# Patient Record
Sex: MALE | Race: WHITE | NOT HISPANIC OR LATINO | Employment: OTHER | URBAN - NONMETROPOLITAN AREA
[De-identification: names, ages, dates, MRNs, and addresses within clinical notes are randomized per-mention and may not be internally consistent; named-entity substitution may affect disease eponyms.]

---

## 2017-01-03 ENCOUNTER — OFFICE VISIT (OUTPATIENT)
Dept: FAMILY MEDICINE CLINIC | Facility: CLINIC | Age: 57
End: 2017-01-03

## 2017-01-03 VITALS
SYSTOLIC BLOOD PRESSURE: 130 MMHG | OXYGEN SATURATION: 97 % | DIASTOLIC BLOOD PRESSURE: 90 MMHG | BODY MASS INDEX: 36.36 KG/M2 | HEART RATE: 82 BPM | WEIGHT: 254 LBS | TEMPERATURE: 97.1 F | HEIGHT: 70 IN

## 2017-01-03 DIAGNOSIS — J40 BRONCHITIS: Primary | ICD-10-CM

## 2017-01-03 LAB
EXPIRATION DATE: NORMAL
INTERNAL CONTROL: NORMAL
Lab: NORMAL
S PYO AG THROAT QL: NEGATIVE

## 2017-01-03 PROCEDURE — 87880 STREP A ASSAY W/OPTIC: CPT | Performed by: FAMILY MEDICINE

## 2017-01-03 PROCEDURE — 99213 OFFICE O/P EST LOW 20 MIN: CPT | Performed by: FAMILY MEDICINE

## 2017-01-03 RX ORDER — AZITHROMYCIN 250 MG/1
TABLET, FILM COATED ORAL
Qty: 6 TABLET | Refills: 0 | Status: SHIPPED | OUTPATIENT
Start: 2017-01-03 | End: 2017-05-09

## 2017-01-03 RX ORDER — PROMETHAZINE HYDROCHLORIDE AND CODEINE PHOSPHATE 6.25; 1 MG/5ML; MG/5ML
5 SYRUP ORAL EVERY 4 HOURS PRN
Qty: 180 ML | Refills: 1 | Status: SHIPPED | OUTPATIENT
Start: 2017-01-03 | End: 2017-05-09

## 2017-01-03 NOTE — MR AVS SNAPSHOT
Jean Oliveros   1/3/2017 3:45 PM   Office Visit    Dept Phone:  [phone]   Encounter #:  [id number]    Provider:  Dawood Churchill MD   Department:  [facility]                Your Full Care Plan              Where to Get Your Medications      These medications were sent to [facility]/pharmacy #[id number] - [place], [place] - [address] [place] [address] AT [facility] - [phone]  - [phone] [address] [address]     Phone:  [phone]     azithromycin 250 MG tablet         You can get these medications from any pharmacy     Bring a paper prescription for each of these medications     promethazine-codeine 6.25-10 MG/5ML syrup            Your Updated Medication List          This list is accurate as of: 1/3/17  4:37 PM.  Always use your most recent med list.                azithromycin 250 MG tablet   Commonly known as:  ZITHROMAX   Take 2 tablets the first day, then 1 tablet daily for 4 days.       promethazine-codeine 6.25-10 MG/5ML syrup   Commonly known as:  PHENERGAN with CODEINE   Take 5 mL by mouth Every 4 (Four) Hours As Needed for cough.               We Performed the Following     POC Rapid Strep A       You Were Diagnosed With        Codes Comments    Bronchitis    -  Primary ICD-10-CM: J40  ICD-9-CM: 490       Instructions     None    Patient Instructions History      Upcoming Appointments     Visit Type Date Time Department    SAME DAY 1/3/2017  3:45 PM E [facility]      [employer] Signup     Our records indicate that you have declined [facility] [employer] signup. If you would like to sign up for [employer], please email [email or web address] or call [phone] to obtain an activation code.             Other Info from Your Visit           Allergies     No Known Allergies      Reason for Visit     Nasal Congestion     Sore Throat           Vital Signs     Blood Pressure Pulse Temperature Height Weight Oxygen  "Saturation    130/90 (BP Location: Left arm, Patient Position: Sitting) 82 97.1 °F (36.2 °C) 70\" (177.8 cm) 254 lb (115 kg) 97%    Body Mass Index Smoking Status                36.45 kg/m2 Former Smoker          Problems and Diagnoses Noted     Bronchitis    -  Primary      Results     POC Rapid Strep A      Component Value Standard Range & Units    Rapid Strep A Screen Negative Negative, VALID, INVALID, Not Performed    Internal Control Passed Passed    Lot Number [id number]     Expiration Date 4/2018                     "

## 2017-01-03 NOTE — PROGRESS NOTES
Subjective   Jean Oliveros is a 56 y.o. male.     History of Present Illness   Several day history of headache congestion and cough.  Cough is basically nonproductive.  Perhaps a low-grade fever.  No sneezing itchy eyes to suggest allergies.  The following portions of the patient's history were reviewed and updated as appropriate: allergies, current medications, past family history, past medical history, past social history, past surgical history and problem list.    Review of Systems   Constitutional: Negative for activity change and appetite change.   HENT: Positive for congestion and sore throat. Negative for rhinorrhea and sneezing.    Eyes: Negative for discharge and itching.   Respiratory: Positive for cough. Negative for shortness of breath and wheezing.        Objective   Physical Exam   Constitutional: He appears well-developed and well-nourished.   HENT:   Right Ear: External ear normal.   Left Ear: External ear normal.   Mouth/Throat: Oropharynx is clear and moist.   Eyes: EOM are normal.   Neck: Neck supple.   Cardiovascular: Normal rate, regular rhythm and normal heart sounds.    Pulmonary/Chest: Effort normal and breath sounds normal.   Vitals reviewed.      Assessment/Plan   Jean was seen today for nasal congestion and sore throat.    Diagnoses and all orders for this visit:    Bronchitis  -     promethazine-codeine (PHENERGAN with CODEINE) 6.25-10 MG/5ML syrup; Take 5 mL by mouth Every 4 (Four) Hours As Needed for cough.  -     azithromycin (ZITHROMAX) 250 MG tablet; Take 2 tablets the first day, then 1 tablet daily for 4 days.  -     POC Rapid Strep A      Nasal pharyngitis/bronchitis.  Strep test is negative.  This is probably viral but he wants an antibiotic.  When he took Zithromax about a month ago were really helped.  He also needs a refill on the cough medicine.  If symptoms persist, may order a chest x-ray or even a Depo-Medrol injection.

## 2017-05-09 ENCOUNTER — OFFICE VISIT (OUTPATIENT)
Dept: FAMILY MEDICINE CLINIC | Facility: CLINIC | Age: 57
End: 2017-05-09

## 2017-05-09 VITALS
HEIGHT: 70 IN | BODY MASS INDEX: 34.65 KG/M2 | DIASTOLIC BLOOD PRESSURE: 90 MMHG | OXYGEN SATURATION: 97 % | SYSTOLIC BLOOD PRESSURE: 128 MMHG | HEART RATE: 67 BPM | WEIGHT: 242 LBS

## 2017-05-09 DIAGNOSIS — Z00.00 ANNUAL PHYSICAL EXAM: Primary | ICD-10-CM

## 2017-05-09 PROCEDURE — 99396 PREV VISIT EST AGE 40-64: CPT | Performed by: FAMILY MEDICINE

## 2020-04-07 ENCOUNTER — APPOINTMENT (OUTPATIENT)
Dept: GENERAL RADIOLOGY | Facility: HOSPITAL | Age: 60
End: 2020-04-07

## 2020-04-07 ENCOUNTER — APPOINTMENT (OUTPATIENT)
Dept: CT IMAGING | Facility: HOSPITAL | Age: 60
End: 2020-04-07

## 2020-04-07 ENCOUNTER — HOSPITAL ENCOUNTER (EMERGENCY)
Facility: HOSPITAL | Age: 60
Discharge: HOME | End: 2020-04-07
Attending: EMERGENCY MEDICINE | Admitting: EMERGENCY MEDICINE

## 2020-04-07 VITALS
WEIGHT: 265 LBS | BODY MASS INDEX: 37.94 KG/M2 | DIASTOLIC BLOOD PRESSURE: 95 MMHG | HEART RATE: 50 BPM | RESPIRATION RATE: 14 BRPM | HEIGHT: 70 IN | SYSTOLIC BLOOD PRESSURE: 137 MMHG | TEMPERATURE: 97.4 F | OXYGEN SATURATION: 95 %

## 2020-04-07 DIAGNOSIS — E78.5 HYPERLIPIDEMIA, UNSPECIFIED HYPERLIPIDEMIA TYPE: ICD-10-CM

## 2020-04-07 DIAGNOSIS — Z87.74 HISTORY OF CONGENITAL MITRAL REGURGITATION: ICD-10-CM

## 2020-04-07 DIAGNOSIS — R07.89 ATYPICAL CHEST PAIN: Primary | ICD-10-CM

## 2020-04-07 DIAGNOSIS — R03.0 ELEVATED BLOOD PRESSURE READING: ICD-10-CM

## 2020-04-07 LAB
ALBUMIN SERPL-MCNC: 4.5 G/DL (ref 3.5–5.2)
ALBUMIN/GLOB SERPL: 1.5 G/DL
ALP SERPL-CCNC: 74 U/L (ref 39–117)
ALT SERPL W P-5'-P-CCNC: 23 U/L (ref 1–41)
ANION GAP SERPL CALCULATED.3IONS-SCNC: 12 MMOL/L (ref 5–15)
AST SERPL-CCNC: 18 U/L (ref 1–40)
BASOPHILS # BLD AUTO: 0.04 10*3/MM3 (ref 0–0.2)
BASOPHILS NFR BLD AUTO: 0.5 % (ref 0–1.5)
BILIRUB SERPL-MCNC: 0.7 MG/DL (ref 0.2–1.2)
BUN BLD-MCNC: 10 MG/DL (ref 6–20)
BUN/CREAT SERPL: 10.1 (ref 7–25)
CALCIUM SPEC-SCNC: 9.8 MG/DL (ref 8.6–10.5)
CHLORIDE SERPL-SCNC: 100 MMOL/L (ref 98–107)
CO2 SERPL-SCNC: 27 MMOL/L (ref 22–29)
CREAT BLD-MCNC: 0.99 MG/DL (ref 0.76–1.27)
D DIMER PPP FEU-MCNC: 2.34 MCGFEU/ML (ref 0–0.56)
DEPRECATED RDW RBC AUTO: 43.2 FL (ref 37–54)
EOSINOPHIL # BLD AUTO: 0.27 10*3/MM3 (ref 0–0.4)
EOSINOPHIL NFR BLD AUTO: 3.5 % (ref 0.3–6.2)
ERYTHROCYTE [DISTWIDTH] IN BLOOD BY AUTOMATED COUNT: 13.2 % (ref 12.3–15.4)
GFR SERPL CREATININE-BSD FRML MDRD: 77 ML/MIN/1.73
GLOBULIN UR ELPH-MCNC: 3 GM/DL
GLUCOSE BLD-MCNC: 115 MG/DL (ref 65–99)
HCT VFR BLD AUTO: 45.8 % (ref 37.5–51)
HGB BLD-MCNC: 15.1 G/DL (ref 13–17.7)
HOLD SPECIMEN: NORMAL
HOLD SPECIMEN: NORMAL
IMM GRANULOCYTES # BLD AUTO: 0.05 10*3/MM3 (ref 0–0.05)
IMM GRANULOCYTES NFR BLD AUTO: 0.6 % (ref 0–0.5)
LIPASE SERPL-CCNC: 23 U/L (ref 13–60)
LYMPHOCYTES # BLD AUTO: 2.22 10*3/MM3 (ref 0.7–3.1)
LYMPHOCYTES NFR BLD AUTO: 28.6 % (ref 19.6–45.3)
MCH RBC QN AUTO: 29.6 PG (ref 26.6–33)
MCHC RBC AUTO-ENTMCNC: 33 G/DL (ref 31.5–35.7)
MCV RBC AUTO: 89.8 FL (ref 79–97)
MONOCYTES # BLD AUTO: 0.77 10*3/MM3 (ref 0.1–0.9)
MONOCYTES NFR BLD AUTO: 9.9 % (ref 5–12)
NEUTROPHILS # BLD AUTO: 4.41 10*3/MM3 (ref 1.7–7)
NEUTROPHILS NFR BLD AUTO: 56.9 % (ref 42.7–76)
NRBC BLD AUTO-RTO: 0 /100 WBC (ref 0–0.2)
NT-PROBNP SERPL-MCNC: 30 PG/ML (ref 5–900)
PLATELET # BLD AUTO: 295 10*3/MM3 (ref 140–450)
PMV BLD AUTO: 10.6 FL (ref 6–12)
POTASSIUM BLD-SCNC: 4.1 MMOL/L (ref 3.5–5.2)
PROT SERPL-MCNC: 7.5 G/DL (ref 6–8.5)
RBC # BLD AUTO: 5.1 10*6/MM3 (ref 4.14–5.8)
SODIUM BLD-SCNC: 139 MMOL/L (ref 136–145)
TROPONIN T SERPL-MCNC: <0.01 NG/ML (ref 0–0.03)
TROPONIN T SERPL-MCNC: <0.01 NG/ML (ref 0–0.03)
WBC NRBC COR # BLD: 7.76 10*3/MM3 (ref 3.4–10.8)
WHOLE BLOOD HOLD SPECIMEN: NORMAL
WHOLE BLOOD HOLD SPECIMEN: NORMAL

## 2020-04-07 PROCEDURE — 83690 ASSAY OF LIPASE: CPT

## 2020-04-07 PROCEDURE — 93005 ELECTROCARDIOGRAM TRACING: CPT | Performed by: EMERGENCY MEDICINE

## 2020-04-07 PROCEDURE — 80053 COMPREHEN METABOLIC PANEL: CPT

## 2020-04-07 PROCEDURE — 84484 ASSAY OF TROPONIN QUANT: CPT

## 2020-04-07 PROCEDURE — 96374 THER/PROPH/DIAG INJ IV PUSH: CPT

## 2020-04-07 PROCEDURE — 93005 ELECTROCARDIOGRAM TRACING: CPT

## 2020-04-07 PROCEDURE — 83880 ASSAY OF NATRIURETIC PEPTIDE: CPT

## 2020-04-07 PROCEDURE — 71275 CT ANGIOGRAPHY CHEST: CPT

## 2020-04-07 PROCEDURE — 85379 FIBRIN DEGRADATION QUANT: CPT | Performed by: EMERGENCY MEDICINE

## 2020-04-07 PROCEDURE — 99283 EMERGENCY DEPT VISIT LOW MDM: CPT

## 2020-04-07 PROCEDURE — 0 IOPAMIDOL PER 1 ML: Performed by: EMERGENCY MEDICINE

## 2020-04-07 PROCEDURE — 85025 COMPLETE CBC W/AUTO DIFF WBC: CPT

## 2020-04-07 PROCEDURE — 84484 ASSAY OF TROPONIN QUANT: CPT | Performed by: EMERGENCY MEDICINE

## 2020-04-07 RX ORDER — FAMOTIDINE 10 MG/ML
20 INJECTION, SOLUTION INTRAVENOUS ONCE
Status: COMPLETED | OUTPATIENT
Start: 2020-04-07 | End: 2020-04-07

## 2020-04-07 RX ORDER — CETIRIZINE HYDROCHLORIDE 10 MG/1
10 TABLET ORAL DAILY
COMMUNITY

## 2020-04-07 RX ORDER — ACETAMINOPHEN 500 MG
1000 TABLET ORAL ONCE
Status: COMPLETED | OUTPATIENT
Start: 2020-04-07 | End: 2020-04-07

## 2020-04-07 RX ORDER — SODIUM CHLORIDE 0.9 % (FLUSH) 0.9 %
10 SYRINGE (ML) INJECTION AS NEEDED
Status: DISCONTINUED | OUTPATIENT
Start: 2020-04-07 | End: 2020-04-07 | Stop reason: HOSPADM

## 2020-04-07 RX ADMIN — IOPAMIDOL 85 ML: 755 INJECTION, SOLUTION INTRAVENOUS at 12:37

## 2020-04-07 RX ADMIN — ACETAMINOPHEN 1000 MG: 500 TABLET, FILM COATED ORAL at 12:10

## 2020-04-07 RX ADMIN — FAMOTIDINE 20 MG: 10 INJECTION INTRAVENOUS at 12:09

## 2020-04-07 RX ADMIN — NITROGLYCERIN 1 INCH: 20 OINTMENT TOPICAL at 12:08

## 2020-04-07 NOTE — ED PROVIDER NOTES
Subjective   Jean Oliveros is a 59 y.o. male who presents to the ED with c/o chest pain. The patient reports waking up this morning with sharp left sternal chest pain that is exacerbated by exertion and bending over, while deep breathing is ineffective. He states that he had one episode in 2008 similar to the current episode when a cardiac stress test revealed a mitral valve prolapse. The patient complains of shortness of breath and nausea but denies vomiting or diarrhea. He reports being a former tobacco smoker. The patient denies any history of myocardial infarction. His cardiologist is Dr. Rod. He has a surgical history including ventral hernia repair and hemorrhoid surgery. There are no other acute complaints at this time.      History provided by:  Patient  Chest Pain   Pain location:  L chest  Pain quality: sharp    Pain radiates to:  Does not radiate  Pain severity:  Moderate  Onset quality:  Sudden  Duration:  3 hours  Timing:  Constant  Progression:  Waxing and waning  Chronicity:  New  Context: at rest    Relieved by:  Nothing  Worsened by:  Exertion and certain positions (bending over)  Ineffective treatments:  Rest (deep breathing)  Associated symptoms: nausea and shortness of breath    Associated symptoms: no cough, no fever, no syncope, no vomiting and no weakness    Risk factors: male sex and obesity    Risk factors: no coronary artery disease, no diabetes mellitus, no high cholesterol, no hypertension, no prior DVT/PE and no smoking        Review of Systems   Constitutional: Negative for fever.   Respiratory: Positive for shortness of breath. Negative for cough.    Cardiovascular: Positive for chest pain. Negative for syncope.   Gastrointestinal: Positive for nausea. Negative for diarrhea and vomiting.   Neurological: Negative for weakness.   All other systems reviewed and are negative.      Past Medical History:   Diagnosis Date   • Mitral valve prolapse        No Known Allergies    Past Surgical  History:   Procedure Laterality Date   • HEMORRHOIDECTOMY N/A    • VENTRAL HERNIA REPAIR N/A        History reviewed. No pertinent family history.    Social History     Socioeconomic History   • Marital status:      Spouse name: Not on file   • Number of children: 4   • Years of education: Not on file   • Highest education level: Not on file   Occupational History   • Occupation: farmer   Tobacco Use   • Smoking status: Former Smoker   Substance and Sexual Activity   • Alcohol use: Yes   • Drug use: No   • Sexual activity: Defer     Partners: Female         Objective   Physical Exam   Constitutional: He is oriented to person, place, and time. He appears well-developed and well-nourished. No distress.   HENT:   Head: Normocephalic and atraumatic.   Eyes: Conjunctivae are normal. No scleral icterus.   Neck: Normal range of motion. Neck supple.   Cardiovascular: Regular rhythm, normal heart sounds and intact distal pulses. Bradycardia present.   No murmur heard.  Bradycardia with occasional ectopy.   Pulmonary/Chest: Effort normal and breath sounds normal. No respiratory distress.   Abdominal: Soft. There is no tenderness.   Obese.   Musculoskeletal: Normal range of motion. He exhibits edema.        Right lower leg: He exhibits edema. He exhibits no tenderness.        Left lower leg: He exhibits edema. He exhibits no tenderness.   Trace pitting edema to the bilateral lower extremity edema.   Neurological: He is alert and oriented to person, place, and time.   Skin: Skin is warm and dry.   Psychiatric: He has a normal mood and affect. His behavior is normal.   Nursing note and vitals reviewed.      Procedures         ED Course  ED Course as of Apr 07 1546   Tue Apr 07, 2020   1151 Troponin T: <0.010 [RS]   1153 D-Dimer, Quant(!): 2.34 [RS]   1215 Dr. Jones is bedside re-evaluating the patient and updating the patient     [BS]   1407 Troponin T: <0.010 [RS]   1415 The patient is resting comfortably and in no  distress.  CT scan is negative.  Will discharge the patient home to follow-up with his primary care physician with referral to the chest pain clinic for stress test. I had a discussion with the patient/family regarding diagnosis, diagnostic results, treatment plan, and medications.  The patient/family indicated understanding of these instructions.  I spent adequate time at the bedside proceeding discharge necessary to personally discuss the aftercare instructions, giving patient education, providing explanations of the results of our evaluations/findings, and my decision making to assure that the patient/family understand the plan of care.  Time was allotted to answer questions at that time and throughout the ED course.  Emphasis was placed on timely follow-up after discharge.  I also discussed the potential for the development of an acute emergent condition requiring further evaluation, admission, or even surgical intervention. I discussed that we found nothing during the visit today indicating the need for further workup, admission, or the presence of an unstable medical condition.  I encouraged the patient to return to the emergency department immediately for ANY concerns, worsening, new complaints, or if symptoms persist and unable to seek follow-up in a timely fashion.  The patient/family expressed understanding and agreement with this plan.     [RS]      ED Course User Index  [BS] Clark De La Rosa  [RS] Steven Jones MD     Recent Results (from the past 24 hour(s))   Troponin    Collection Time: 04/07/20 11:13 AM   Result Value Ref Range    Troponin T <0.010 0.000 - 0.030 ng/mL   Comprehensive Metabolic Panel    Collection Time: 04/07/20 11:13 AM   Result Value Ref Range    Glucose 115 (H) 65 - 99 mg/dL    BUN 10 6 - 20 mg/dL    Creatinine 0.99 0.76 - 1.27 mg/dL    Sodium 139 136 - 145 mmol/L    Potassium 4.1 3.5 - 5.2 mmol/L    Chloride 100 98 - 107 mmol/L    CO2 27.0 22.0 - 29.0 mmol/L    Calcium 9.8 8.6  - 10.5 mg/dL    Total Protein 7.5 6.0 - 8.5 g/dL    Albumin 4.50 3.50 - 5.20 g/dL    ALT (SGPT) 23 1 - 41 U/L    AST (SGOT) 18 1 - 40 U/L    Alkaline Phosphatase 74 39 - 117 U/L    Total Bilirubin 0.7 0.2 - 1.2 mg/dL    eGFR Non African Amer 77 >60 mL/min/1.73    Globulin 3.0 gm/dL    A/G Ratio 1.5 g/dL    BUN/Creatinine Ratio 10.1 7.0 - 25.0    Anion Gap 12.0 5.0 - 15.0 mmol/L   Lipase    Collection Time: 04/07/20 11:13 AM   Result Value Ref Range    Lipase 23 13 - 60 U/L   BNP    Collection Time: 04/07/20 11:13 AM   Result Value Ref Range    proBNP 30.0 5.0 - 900.0 pg/mL   Light Blue Top    Collection Time: 04/07/20 11:13 AM   Result Value Ref Range    Extra Tube     Green Top (Gel)    Collection Time: 04/07/20 11:13 AM   Result Value Ref Range    Extra Tube Hold for add-ons.    Lavender Top    Collection Time: 04/07/20 11:13 AM   Result Value Ref Range    Extra Tube hold for add-on    Gold Top - SST    Collection Time: 04/07/20 11:13 AM   Result Value Ref Range    Extra Tube Hold for add-ons.    CBC Auto Differential    Collection Time: 04/07/20 11:13 AM   Result Value Ref Range    WBC 7.76 3.40 - 10.80 10*3/mm3    RBC 5.10 4.14 - 5.80 10*6/mm3    Hemoglobin 15.1 13.0 - 17.7 g/dL    Hematocrit 45.8 37.5 - 51.0 %    MCV 89.8 79.0 - 97.0 fL    MCH 29.6 26.6 - 33.0 pg    MCHC 33.0 31.5 - 35.7 g/dL    RDW 13.2 12.3 - 15.4 %    RDW-SD 43.2 37.0 - 54.0 fl    MPV 10.6 6.0 - 12.0 fL    Platelets 295 140 - 450 10*3/mm3    Neutrophil % 56.9 42.7 - 76.0 %    Lymphocyte % 28.6 19.6 - 45.3 %    Monocyte % 9.9 5.0 - 12.0 %    Eosinophil % 3.5 0.3 - 6.2 %    Basophil % 0.5 0.0 - 1.5 %    Immature Grans % 0.6 (H) 0.0 - 0.5 %    Neutrophils, Absolute 4.41 1.70 - 7.00 10*3/mm3    Lymphocytes, Absolute 2.22 0.70 - 3.10 10*3/mm3    Monocytes, Absolute 0.77 0.10 - 0.90 10*3/mm3    Eosinophils, Absolute 0.27 0.00 - 0.40 10*3/mm3    Basophils, Absolute 0.04 0.00 - 0.20 10*3/mm3    Immature Grans, Absolute 0.05 0.00 - 0.05 10*3/mm3     "nRBC 0.0 0.0 - 0.2 /100 WBC   D-dimer, Quantitative    Collection Time: 04/07/20 11:13 AM   Result Value Ref Range    D-Dimer, Quantitative 2.34 (H) 0.00 - 0.56 MCGFEU/mL   Troponin    Collection Time: 04/07/20  1:19 PM   Result Value Ref Range    Troponin T <0.010 0.000 - 0.030 ng/mL     Note: In addition to lab results from this visit, the labs listed above may include labs taken at another facility or during a different encounter within the last 24 hours. Please correlate lab times with ED admission and discharge times for further clarification of the services performed during this visit.    CT Angiogram Chest   Preliminary Result   No evidence of active chest disease. In particular, no   evidence of pulmonary embolus. No evidence of COVID-19 pneumonia or   other pneumonia.       D:  04/07/2020   E:  04/07/2020            Vitals:    04/07/20 1107 04/07/20 1109 04/07/20 1110 04/07/20 1400   BP:   142/88 137/95   Pulse:  56  50   Resp: 14      Temp: 97.4 °F (36.3 °C)      TempSrc: Oral      SpO2:  95%  95%   Weight: 120 kg (265 lb)      Height: 177.8 cm (70\")        Medications   sodium chloride 0.9 % flush 10 mL (has no administration in time range)   nitroglycerin (NITROSTAT) ointment 1 inch (1 inch Topical Given 4/7/20 1208)   famotidine (PEPCID) injection 20 mg (20 mg Intravenous Given 4/7/20 1209)   acetaminophen (TYLENOL) tablet 1,000 mg (1,000 mg Oral Given 4/7/20 1210)   iopamidol (ISOVUE-370) 76 % injection 100 mL (85 mL Intravenous Given 4/7/20 1237)     ECG/EMG Results (last 24 hours)     Procedure Component Value Units Date/Time    ECG 12 Lead [[id number]] Collected:  04/07/20 1108     Updated:  04/07/20 1109    Narrative:       Test Reason : cp  Blood Pressure : **/** mmHG  Vent. Rate : 056 BPM     Atrial Rate : 056 BPM     P-R Int : 158 ms          QRS Dur : 098 ms      QT Int : 428 ms       P-R-T Axes : 009 -40 038 degrees     QTc Int : 413 ms    Sinus bradycardia  Left axis deviation  Abnormal " ECG  When compared with ECG of 30-JAN-2015 12:22,  No significant change was found  Confirmed by BURTON JONES MD ([id number]) on 4/7/2020 11:09:01 AM    Referred By:  MADELYN           Confirmed By:BURTON JONES MD        ECG 12 Lead         ECG 12 Lead   Final Result   Test Reason : cp   Blood Pressure : **/** mmHG   Vent. Rate : 056 BPM     Atrial Rate : 056 BPM      P-R Int : 158 ms          QRS Dur : 098 ms       QT Int : 428 ms       P-R-T Axes : 009 -40 038 degrees      QTc Int : 413 ms      Sinus bradycardia   Left axis deviation   Abnormal ECG   When compared with ECG of 30-JAN-2015 12:22,   No significant change was found   Confirmed by BURTON JONES MD ([id number]) on 4/7/2020 11:09:01 AM      Referred By:  MADELYN           Confirmed By:BURTON JOENS MD      ECG 12 Lead    (Results Pending)                                          HEART Score (for prediction of 6-week risk of major adverse cardiac event) reviewed and/or performed as part of the patient evaluation and treatment planning process.  The result associated with this review/performance is: 2           MDM  Number of Diagnoses or Management Options  Atypical chest pain:   Elevated blood pressure reading:   History of congenital mitral regurgitation:   Hyperlipidemia, unspecified hyperlipidemia type:      Amount and/or Complexity of Data Reviewed  Clinical lab tests: reviewed  Tests in the radiology section of CPT®: reviewed  Independent visualization of images, tracings, or specimens: yes        Final diagnoses:   Atypical chest pain   History of congenital mitral regurgitation   Hyperlipidemia, unspecified hyperlipidemia type   Elevated blood pressure reading       Documentation assistance provided by juan pablo De La Rosa.  Information recorded by the scribalondra was done at my direction and has been verified and validated by me.     Clark De La Rosa  04/07/20 1202       Clark De La Rosa  04/07/20 1417       Burton Jones MD  04/07/20 8517

## 2020-04-07 NOTE — DISCHARGE INSTRUCTIONS
Examination and testing today did not reveal a specific cause of your chest pain.  It is important to understand that this does not mean “nothing is wrong” but rather that there is currently no evidence to support a specific diagnosis.  Some diseases, like heart disease, can be very serious but develop slowly over time and often the testing available in the ED will be “normal” or “nondiagnostic” even when significant blockage in the arteries is present.  This is why it is very important for you to have close followup for further testing, for example a stress test.  You are being referred to our chest pain followup clinic to help facilitate and expedite this further testing.  Please do not put off or delay further evaluation as the consequences may be severe.  If at any time you have worsening symptoms or develop any change in your condition that concerns you, please return to the ED for immediate evaluation.

## 2020-04-10 ENCOUNTER — TELEPHONE (OUTPATIENT)
Dept: CARDIOLOGY | Facility: HOSPITAL | Age: 60
End: 2020-04-10

## 2020-04-10 NOTE — TELEPHONE ENCOUNTER
Patient was referred to Heart and Vascular by [facility]. Several attempts have been made to contact patient with no success. Letter was mailed to patient to call the office to schedule.